# Patient Record
Sex: FEMALE | Race: BLACK OR AFRICAN AMERICAN | NOT HISPANIC OR LATINO | Employment: FULL TIME | URBAN - METROPOLITAN AREA
[De-identification: names, ages, dates, MRNs, and addresses within clinical notes are randomized per-mention and may not be internally consistent; named-entity substitution may affect disease eponyms.]

---

## 2017-12-28 LAB
INFLUENZA A ANTIGEN, POC: NEGATIVE
INFLUENZA B ANTIGEN, POC: NEGATIVE
RAPID GROUP A STREP (OHS): NEGATIVE

## 2018-05-16 ENCOUNTER — HISTORICAL (OUTPATIENT)
Dept: ADMINISTRATIVE | Facility: HOSPITAL | Age: 38
End: 2018-05-16

## 2018-05-16 LAB
ABS NEUT (OLG): 1.1
ALBUMIN SERPL-MCNC: 4.2 GM/DL (ref 3.4–5)
ALBUMIN/GLOB SERPL: 1.83 {RATIO} (ref 1.5–2.5)
ALP SERPL-CCNC: 19 UNIT/L (ref 38–126)
ALT SERPL-CCNC: 8 UNIT/L (ref 7–52)
AST SERPL-CCNC: 12 UNIT/L (ref 15–37)
BILIRUB SERPL-MCNC: 0.6 MG/DL (ref 0.2–1)
BILIRUBIN DIRECT+TOT PNL SERPL-MCNC: 0.1 MG/DL (ref 0–0.5)
BILIRUBIN DIRECT+TOT PNL SERPL-MCNC: 0.5 MG/DL
BUN SERPL-MCNC: 14 MG/DL (ref 7–18)
CALCIUM SERPL-MCNC: 8.6 MG/DL (ref 8.5–10)
CHLORIDE SERPL-SCNC: 105 MMOL/L (ref 98–107)
CHOLEST SERPL-MCNC: 158 MG/DL (ref 0–200)
CHOLEST/HDLC SERPL: 3 {RATIO}
CO2 SERPL-SCNC: 31 MMOL/L (ref 21–32)
CREAT SERPL-MCNC: 0.78 MG/DL (ref 0.6–1.3)
DEPRECATED CALCIDIOL+CALCIFEROL SERPL-MC: 19.9 NG/ML (ref 30–80)
ERYTHROCYTE [DISTWIDTH] IN BLOOD BY AUTOMATED COUNT: 12.1 % (ref 11.5–17)
GLOBULIN SER-MCNC: 2.3 GM/DL (ref 1.2–3)
GLUCOSE SERPL-MCNC: 89 MG/DL (ref 74–106)
HCT VFR BLD AUTO: 38.1 % (ref 37–47)
HDLC SERPL-MCNC: 52 MG/DL (ref 35–60)
HGB BLD-MCNC: 12.8 GM/DL (ref 12–16)
LDLC SERPL CALC-MCNC: 89 MG/DL (ref 0–129)
LYMPHOCYTES # BLD AUTO: 1.3 X10(3)/MCL (ref 0.6–3.4)
LYMPHOCYTES NFR BLD AUTO: 46.6 % (ref 13–40)
MCH RBC QN AUTO: 32.6 PG (ref 27–31.2)
MCHC RBC AUTO-ENTMCNC: 34 GM/DL (ref 32–36)
MCV RBC AUTO: 97 FL (ref 80–94)
MONOCYTES # BLD AUTO: 0.3 X10(3)/MCL (ref 0–1.8)
MONOCYTES NFR BLD AUTO: 12.5 % (ref 0.1–24)
NEUTROPHILS NFR BLD AUTO: 40.9 % (ref 47–80)
PLATELET # BLD AUTO: 224 X10(3)/MCL (ref 130–400)
PMV BLD AUTO: 10.9 FL
POTASSIUM SERPL-SCNC: 4.9 MMOL/L (ref 3.5–5.1)
PROT SERPL-MCNC: 6.5 GM/DL (ref 6.4–8.2)
RBC # BLD AUTO: 3.93 X10(6)/MCL (ref 4.2–5.4)
SODIUM SERPL-SCNC: 140 MMOL/L (ref 136–145)
TRIGL SERPL-MCNC: 35 MG/DL (ref 30–150)
VLDLC SERPL CALC-MCNC: 7 MG/DL
WBC # SPEC AUTO: 2.7 X10(3)/MCL (ref 4.5–11.5)

## 2018-12-31 ENCOUNTER — HISTORICAL (OUTPATIENT)
Dept: ADMINISTRATIVE | Facility: HOSPITAL | Age: 38
End: 2018-12-31

## 2018-12-31 LAB
ABS NEUT (OLG): 1.7 X10(3)/MCL (ref 2.1–9.2)
ALBUMIN SERPL-MCNC: 4.3 GM/DL (ref 3.4–5)
ALBUMIN/GLOB SERPL: 1.54 {RATIO} (ref 1.5–2.5)
ALP SERPL-CCNC: 21 UNIT/L (ref 38–126)
ALT SERPL-CCNC: 15 UNIT/L (ref 7–52)
AST SERPL-CCNC: 12 UNIT/L (ref 15–37)
BILIRUB SERPL-MCNC: 0.8 MG/DL (ref 0.2–1)
BILIRUBIN DIRECT+TOT PNL SERPL-MCNC: 0.2 MG/DL (ref 0–0.5)
BILIRUBIN DIRECT+TOT PNL SERPL-MCNC: 0.6 MG/DL
BUN SERPL-MCNC: 15 MG/DL (ref 7–18)
CALCIUM SERPL-MCNC: 9.2 MG/DL (ref 8.5–10)
CHLORIDE SERPL-SCNC: 103 MMOL/L (ref 98–107)
CO2 SERPL-SCNC: 32 MMOL/L (ref 21–32)
CREAT SERPL-MCNC: 0.83 MG/DL (ref 0.6–1.3)
DEPRECATED CALCIDIOL+CALCIFEROL SERPL-MC: 31.4 NG/ML (ref 30–80)
ERYTHROCYTE [DISTWIDTH] IN BLOOD BY AUTOMATED COUNT: 12.5 % (ref 11.5–17)
GLOBULIN SER-MCNC: 2.8 GM/DL (ref 1.2–3)
GLUCOSE SERPL-MCNC: 93 MG/DL (ref 74–106)
HCT VFR BLD AUTO: 40.7 % (ref 37–47)
HGB BLD-MCNC: 12.9 GM/DL (ref 12–16)
LYMPHOCYTES # BLD AUTO: 1.5 X10(3)/MCL (ref 0.6–3.4)
LYMPHOCYTES NFR BLD AUTO: 39.7 % (ref 13–40)
MCH RBC QN AUTO: 32.1 PG (ref 27–31.2)
MCHC RBC AUTO-ENTMCNC: 32 GM/DL (ref 32–36)
MCV RBC AUTO: 101 FL (ref 80–94)
MONOCYTES # BLD AUTO: 0.6 X10(3)/MCL (ref 0.1–1.3)
MONOCYTES NFR BLD AUTO: 15.9 % (ref 0.1–24)
NEUTROPHILS NFR BLD AUTO: 44.4 % (ref 47–80)
PLATELET # BLD AUTO: 303 X10(3)/MCL (ref 130–400)
PMV BLD AUTO: 9.6 FL (ref 9.4–12.4)
POTASSIUM SERPL-SCNC: 4.8 MMOL/L (ref 3.5–5.1)
PROT SERPL-MCNC: 7.1 GM/DL (ref 6.4–8.2)
RBC # BLD AUTO: 4.02 X10(6)/MCL (ref 4.2–5.4)
SODIUM SERPL-SCNC: 137 MMOL/L (ref 136–145)
WBC # SPEC AUTO: 3.8 X10(3)/MCL (ref 4.5–11.5)

## 2019-07-12 ENCOUNTER — HISTORICAL (OUTPATIENT)
Dept: ADMINISTRATIVE | Facility: HOSPITAL | Age: 39
End: 2019-07-12

## 2019-07-12 LAB
ABS NEUT (OLG): 1.3 X10(3)/MCL (ref 2.1–9.2)
ALBUMIN SERPL-MCNC: 4 GM/DL (ref 3.4–5)
ALBUMIN/GLOB SERPL: 1.54 {RATIO} (ref 1.5–2.5)
ALP SERPL-CCNC: 21 UNIT/L (ref 38–126)
ALT SERPL-CCNC: 7 UNIT/L (ref 7–52)
APPEARANCE, UA: ABNORMAL
AST SERPL-CCNC: 11 UNIT/L (ref 15–37)
BACTERIA #/AREA URNS AUTO: ABNORMAL /HPF
BILIRUB SERPL-MCNC: 0.6 MG/DL (ref 0.2–1)
BILIRUB UR QL STRIP: NEGATIVE MG/DL
BILIRUBIN DIRECT+TOT PNL SERPL-MCNC: 0.2 MG/DL (ref 0–0.5)
BILIRUBIN DIRECT+TOT PNL SERPL-MCNC: 0.4 MG/DL
BUN SERPL-MCNC: 13 MG/DL (ref 7–18)
CALCIUM SERPL-MCNC: 8.6 MG/DL (ref 8.5–10)
CHLORIDE SERPL-SCNC: 106 MMOL/L (ref 98–107)
CHOLEST SERPL-MCNC: 164 MG/DL (ref 0–200)
CHOLEST/HDLC SERPL: 2.9 {RATIO}
CO2 SERPL-SCNC: 29 MMOL/L (ref 21–32)
COLOR UR: YELLOW
CREAT SERPL-MCNC: 0.78 MG/DL (ref 0.6–1.3)
DEPRECATED CALCIDIOL+CALCIFEROL SERPL-MC: 23.5 NG/ML (ref 30–80)
ERYTHROCYTE [DISTWIDTH] IN BLOOD BY AUTOMATED COUNT: 12.5 % (ref 11.5–17)
GLOBULIN SER-MCNC: 2.6 GM/DL (ref 1.2–3)
GLUCOSE (UA): NEGATIVE MG/DL
GLUCOSE SERPL-MCNC: 98 MG/DL (ref 74–106)
HCT VFR BLD AUTO: 37.3 % (ref 37–47)
HDLC SERPL-MCNC: 57 MG/DL (ref 35–60)
HGB BLD-MCNC: 12.6 GM/DL (ref 12–16)
HGB UR QL STRIP: ABNORMAL UNIT/L
KETONES UR QL STRIP: NEGATIVE MG/DL
LDLC SERPL CALC-MCNC: 85 MG/DL (ref 0–129)
LEUKOCYTE ESTERASE UR QL STRIP: ABNORMAL UNIT/L
LYMPHOCYTES # BLD AUTO: 1.4 X10(3)/MCL (ref 0.6–3.4)
LYMPHOCYTES NFR BLD AUTO: 44.6 % (ref 13–40)
MCH RBC QN AUTO: 32.1 PG (ref 27–31.2)
MCHC RBC AUTO-ENTMCNC: 34 GM/DL (ref 32–36)
MCV RBC AUTO: 95 FL (ref 80–94)
MONOCYTES # BLD AUTO: 0.4 X10(3)/MCL (ref 0.1–1.3)
MONOCYTES NFR BLD AUTO: 11.7 % (ref 0.1–24)
NEUTROPHILS NFR BLD AUTO: 43.7 % (ref 47–80)
NITRITE UR QL STRIP.AUTO: NEGATIVE
PH UR STRIP: 7 [PH]
PLATELET # BLD AUTO: 244 X10(3)/MCL (ref 130–400)
PMV BLD AUTO: 10.4 FL (ref 9.4–12.4)
POTASSIUM SERPL-SCNC: 4.5 MMOL/L (ref 3.5–5.1)
PROT SERPL-MCNC: 6.6 GM/DL (ref 6.4–8.2)
PROT UR QL STRIP: NEGATIVE MG/DL
RBC # BLD AUTO: 3.93 X10(6)/MCL (ref 4.2–5.4)
RBC #/AREA URNS HPF: ABNORMAL /HPF
SODIUM SERPL-SCNC: 137 MMOL/L (ref 136–145)
SP GR UR STRIP: 1.02
SQUAMOUS EPITHELIAL, UA: ABNORMAL /LPF
TRIGL SERPL-MCNC: 37 MG/DL (ref 30–150)
TSH SERPL-ACNC: 1.85 MIU/ML (ref 0.35–4.94)
UROBILINOGEN UR STRIP-ACNC: 0.2 MG/DL
VLDLC SERPL CALC-MCNC: 7.4 MG/DL
WBC # SPEC AUTO: 3.1 X10(3)/MCL (ref 4.5–11.5)
WBC #/AREA URNS AUTO: ABNORMAL /[HPF]

## 2019-07-23 ENCOUNTER — HISTORICAL (OUTPATIENT)
Dept: ADMINISTRATIVE | Facility: HOSPITAL | Age: 39
End: 2019-07-23

## 2019-07-23 LAB
APPEARANCE, UA: CLEAR
BACTERIA #/AREA URNS AUTO: NORMAL /HPF
BILIRUB UR QL STRIP: NEGATIVE MG/DL
COLOR UR: YELLOW
GLUCOSE (UA): NEGATIVE MG/DL
HGB UR QL STRIP: NEGATIVE UNIT/L
KETONES UR QL STRIP: NEGATIVE MG/DL
LEUKOCYTE ESTERASE UR QL STRIP: NEGATIVE UNIT/L
NITRITE UR QL STRIP.AUTO: NEGATIVE
PH UR STRIP: 7.5 [PH]
PROT UR QL STRIP: NEGATIVE MG/DL
RBC #/AREA URNS HPF: NORMAL /HPF
SP GR UR STRIP: 1.01
SQUAMOUS EPITHELIAL, UA: NORMAL /LPF
UROBILINOGEN UR STRIP-ACNC: 0.2 MG/DL
WBC #/AREA URNS AUTO: NORMAL /[HPF]

## 2020-12-22 ENCOUNTER — HISTORICAL (OUTPATIENT)
Dept: ADMINISTRATIVE | Facility: HOSPITAL | Age: 40
End: 2020-12-22

## 2020-12-22 LAB
ABS NEUT (OLG): 1.6 X10(3)/MCL (ref 2.1–9.2)
ALBUMIN SERPL-MCNC: 4.1 GM/DL (ref 3.4–5)
ALBUMIN/GLOB SERPL: 1.58 {RATIO} (ref 1.5–2.5)
ALP SERPL-CCNC: 22 UNIT/L (ref 38–126)
ALT SERPL-CCNC: 8 UNIT/L (ref 7–52)
APPEARANCE, UA: ABNORMAL
AST SERPL-CCNC: 14 UNIT/L (ref 15–37)
BACTERIA #/AREA URNS AUTO: ABNORMAL /HPF
BILIRUB SERPL-MCNC: 0.6 MG/DL (ref 0.2–1)
BILIRUB UR QL STRIP: NEGATIVE MG/DL
BILIRUBIN DIRECT+TOT PNL SERPL-MCNC: 0.1 MG/DL (ref 0–0.5)
BILIRUBIN DIRECT+TOT PNL SERPL-MCNC: 0.5 MG/DL
BUN SERPL-MCNC: 17 MG/DL (ref 7–18)
CALCIUM SERPL-MCNC: 9 MG/DL (ref 8.5–10)
CHLORIDE SERPL-SCNC: 106 MMOL/L (ref 98–107)
CHOLEST SERPL-MCNC: 171 MG/DL (ref 0–200)
CHOLEST/HDLC SERPL: 3 {RATIO}
CO2 SERPL-SCNC: 29 MMOL/L (ref 21–32)
COLOR UR: YELLOW
CREAT SERPL-MCNC: 0.83 MG/DL (ref 0.6–1.3)
DEPRECATED CALCIDIOL+CALCIFEROL SERPL-MC: 64.1 NG/ML (ref 30–80)
ERYTHROCYTE [DISTWIDTH] IN BLOOD BY AUTOMATED COUNT: 12.9 % (ref 11.5–17)
GLOBULIN SER-MCNC: 2.6 GM/DL (ref 1.2–3)
GLUCOSE (UA): NEGATIVE MG/DL
GLUCOSE SERPL-MCNC: 87 MG/DL (ref 74–106)
HCT VFR BLD AUTO: 37.6 % (ref 37–47)
HDLC SERPL-MCNC: 57 MG/DL (ref 35–60)
HGB BLD-MCNC: 12.3 GM/DL (ref 12–16)
HGB UR QL STRIP: NEGATIVE UNIT/L
KETONES UR QL STRIP: NEGATIVE MG/DL
LDLC SERPL CALC-MCNC: 112 MG/DL (ref 0–129)
LEUKOCYTE ESTERASE UR QL STRIP: ABNORMAL UNIT/L
LYMPHOCYTES # BLD AUTO: 1.7 X10(3)/MCL (ref 0.6–3.4)
LYMPHOCYTES NFR BLD AUTO: 46.6 % (ref 13–40)
MCH RBC QN AUTO: 31.1 PG (ref 27–31.2)
MCHC RBC AUTO-ENTMCNC: 33 GM/DL (ref 32–36)
MCV RBC AUTO: 95 FL (ref 80–94)
MONOCYTES # BLD AUTO: 0.4 X10(3)/MCL (ref 0.1–1.3)
MONOCYTES NFR BLD AUTO: 10.2 % (ref 0.1–24)
NEUTROPHILS NFR BLD AUTO: 43.2 % (ref 47–80)
NITRITE UR QL STRIP.AUTO: NEGATIVE
PH UR STRIP: 7 [PH]
PLATELET # BLD AUTO: 318 X10(3)/MCL (ref 130–400)
PMV BLD AUTO: 10 FL (ref 9.4–12.4)
POTASSIUM SERPL-SCNC: 4.9 MMOL/L (ref 3.5–5.1)
PROT SERPL-MCNC: 6.7 GM/DL (ref 6.4–8.2)
PROT UR QL STRIP: NEGATIVE MG/DL
RBC # BLD AUTO: 3.95 X10(6)/MCL (ref 4.2–5.4)
RBC #/AREA URNS HPF: ABNORMAL /HPF
SODIUM SERPL-SCNC: 141 MMOL/L (ref 136–145)
SP GR UR STRIP: 1.02
SQUAMOUS EPITHELIAL, UA: ABNORMAL /LPF
TRIGL SERPL-MCNC: 65 MG/DL (ref 30–150)
TSH SERPL-ACNC: 1.84 MIU/ML (ref 0.35–4.94)
UROBILINOGEN UR STRIP-ACNC: 1 MG/DL
VLDLC SERPL CALC-MCNC: 13 MG/DL
WBC # SPEC AUTO: 3.7 X10(3)/MCL (ref 4.5–11.5)
WBC #/AREA URNS AUTO: ABNORMAL /[HPF]

## 2020-12-28 ENCOUNTER — HISTORICAL (OUTPATIENT)
Dept: ADMINISTRATIVE | Facility: HOSPITAL | Age: 40
End: 2020-12-28

## 2021-07-16 ENCOUNTER — HISTORICAL (OUTPATIENT)
Dept: ADMINISTRATIVE | Facility: HOSPITAL | Age: 41
End: 2021-07-16

## 2021-07-16 LAB
ABS NEUT (OLG): 1.2 X10(3)/MCL (ref 2.1–9.2)
ALBUMIN SERPL-MCNC: 4.3 GM/DL (ref 3.4–5)
ALBUMIN/GLOB SERPL: 1.59 {RATIO} (ref 1.5–2.5)
ALP SERPL-CCNC: 25 UNIT/L (ref 38–126)
ALT SERPL-CCNC: 10 UNIT/L (ref 7–52)
APPEARANCE, UA: ABNORMAL
AST SERPL-CCNC: 16 UNIT/L (ref 15–37)
BACTERIA #/AREA URNS AUTO: ABNORMAL /HPF
BILIRUB SERPL-MCNC: 0.5 MG/DL (ref 0.2–1)
BILIRUB UR QL STRIP: NEGATIVE MG/DL
BILIRUBIN DIRECT+TOT PNL SERPL-MCNC: 0.1 MG/DL (ref 0–0.5)
BILIRUBIN DIRECT+TOT PNL SERPL-MCNC: 0.4 MG/DL
BUN SERPL-MCNC: 9 MG/DL (ref 7–18)
CALCIUM SERPL-MCNC: 9 MG/DL (ref 8.5–10)
CHLORIDE SERPL-SCNC: 103 MMOL/L (ref 98–107)
CHOLEST SERPL-MCNC: 159 MG/DL (ref 0–200)
CHOLEST/HDLC SERPL: 3.1 {RATIO}
CO2 SERPL-SCNC: 29 MMOL/L (ref 21–32)
COLOR UR: YELLOW
CREAT SERPL-MCNC: 0.81 MG/DL (ref 0.6–1.3)
DEPRECATED CALCIDIOL+CALCIFEROL SERPL-MC: 40.7 NG/ML (ref 30–80)
ERYTHROCYTE [DISTWIDTH] IN BLOOD BY AUTOMATED COUNT: 12.1 % (ref 11.5–17)
GLOBULIN SER-MCNC: 2.7 GM/DL (ref 1.2–3)
GLUCOSE (UA): NEGATIVE MG/DL
GLUCOSE SERPL-MCNC: 86 MG/DL (ref 74–106)
HCT VFR BLD AUTO: 39 % (ref 37–47)
HDLC SERPL-MCNC: 51 MG/DL (ref 35–60)
HGB BLD-MCNC: 13 GM/DL (ref 12–16)
HGB UR QL STRIP: ABNORMAL UNIT/L
KETONES UR QL STRIP: ABNORMAL MG/DL
LDLC SERPL CALC-MCNC: 92 MG/DL (ref 0–129)
LEUKOCYTE ESTERASE UR QL STRIP: ABNORMAL UNIT/L
LYMPHOCYTES # BLD AUTO: 1.4 X10(3)/MCL (ref 0.6–3.4)
LYMPHOCYTES NFR BLD AUTO: 50 % (ref 13–40)
MCH RBC QN AUTO: 31.3 PG (ref 27–31.2)
MCHC RBC AUTO-ENTMCNC: 33 GM/DL (ref 32–36)
MCV RBC AUTO: 94 FL (ref 80–94)
MONOCYTES # BLD AUTO: 0.2 X10(3)/MCL (ref 0.1–1.3)
MONOCYTES NFR BLD AUTO: 6.7 % (ref 0.1–24)
NEUTROPHILS NFR BLD AUTO: 43.3 % (ref 47–80)
NITRITE UR QL STRIP.AUTO: NEGATIVE
PH UR STRIP: 7.5 [PH]
PLATELET # BLD AUTO: 289 X10(3)/MCL (ref 130–400)
PMV BLD AUTO: 10.5 FL (ref 9.4–12.4)
POTASSIUM SERPL-SCNC: 5 MMOL/L (ref 3.5–5.1)
PROT SERPL-MCNC: 7 GM/DL (ref 6.4–8.2)
PROT UR QL STRIP: NEGATIVE MG/DL
RBC # BLD AUTO: 4.16 X10(6)/MCL (ref 4.2–5.4)
RBC #/AREA URNS HPF: ABNORMAL /HPF
SODIUM SERPL-SCNC: 139 MMOL/L (ref 136–145)
SP GR UR STRIP: 1.02
SQUAMOUS EPITHELIAL, UA: ABNORMAL /LPF
TRIGL SERPL-MCNC: 55 MG/DL (ref 30–150)
UROBILINOGEN UR STRIP-ACNC: 0.2 MG/DL
VLDLC SERPL CALC-MCNC: 11 MG/DL
WBC # SPEC AUTO: 2.8 X10(3)/MCL (ref 4.5–11.5)
WBC #/AREA URNS AUTO: ABNORMAL /[HPF]

## 2022-04-10 ENCOUNTER — HISTORICAL (OUTPATIENT)
Dept: ADMINISTRATIVE | Facility: HOSPITAL | Age: 42
End: 2022-04-10

## 2022-04-26 VITALS
SYSTOLIC BLOOD PRESSURE: 132 MMHG | WEIGHT: 175.69 LBS | DIASTOLIC BLOOD PRESSURE: 80 MMHG | HEIGHT: 65 IN | BODY MASS INDEX: 29.27 KG/M2

## 2022-05-02 NOTE — HISTORICAL [facility]ERNER
This is a historical note converted from Cade. Formatting and pictures may have been removed.  Please reference Cade for original formatting and attached multimedia. Chief Complaint  6 MTH RECHECK  History of Present Illness  Patient here her 6 month f/u.  COVID infection on 11-8-20.? Finds that she gets SOB very easily since then and gets wheezing.? Had never been a problem for her prior to that.? Continues to have PND since infection.  Works as a  for last 2?years.  No hx of nicotine use.  Review of Systems  General:??????????????? Patient reports energy level is good.??Denies fever,chills, night sweats, fatigue or weakness.  HEENT:?????????????????? Denies vision changes or eye pain.? PND, sinus HA  Cardiovascular:??? Denies chest pain, palpitations, dyspnea on exertion, orthopnea.  Respiratory:???????? see HPI.  GI:????????????????????????? Denies nausea, emesis, constipation, diarrhea, melena, hematochezia or abdominal pain  :??????????????????????? No frequency, urgency, hematuria, discharge, or incontinence  MS:?????????????????????? Denies myalgias, arthralgias, joint effusion, edema, or weakness  Neuro:????????????????? No headaches, numbness in extremities, tingling, dizziness, or weakness  Psych:?????????????????? Denies anxiety, depression, suicidal or homicidal ideations, or irritability  Endo:??????????????????? Denies polyuria, polydipsia, polyphagia  Heme:????????????????? No abnormal bleeding or bruising. No lymph node enlargement or pain.  Physical Exam  Vitals & Measurements  HR:?65(Peripheral)? BP:?123/82?  HT:?165?cm? HT:?165.00?cm? WT:?79.9?kg? WT:?79.900?kg? BMI:?29.35?  General :?????Well-developed and? nourished, no apparent distress, alert and oriented ?4  HEENT:????? TMs and EACs within normal limits,?nasal mucosa with erythema and purulent d/c.? Mild erythema of pharnyx, moist mucosa.  Integument :????? Skin is intact with no erythema.? No pustules or vesicles.? No  rash or scale. No Lymphadenopathy.? No urticaria.? No abnormal nevi  Neck :?????Supple, no lymphadenopathy, no thyromegaly, no bruits, no jugular venous distention  Cardiovascular :?????? Regular rhythm and rate, no murmurs, radial and dorsal pedal pulses 2+ bilaterally  Respiratory :?????Occasional wheeze,?no rhonchi or crackles. ?Good air movement  Abdomen :?????? ?NABS, soft, nontender, no hepatosplenomegaly, no masses, no guarding or rebound????????????????????????  Ext:??????? No muscle tenderness, joints WNL, FROM, negative SLR, no?CCE  Neuro :???????? grossly intact.  Heme :?????? No bruising or petechiae?  Back:??????? no CVAT  ?   X-ray chest: No infiltrate or effusion  Assessment/Plan  1.?HTN - Hypertension?I10  ?Well-controlled  2.?Bronchospasm?J98.01  ?Given prescription for?albuterol?nebulizer solution, nebulizer and supplies.  3.?COVID-19?U07.1  ?Resolved.? Educated on what to expect?post infection.  4.?Sinusitis?J32.9  Prescribed Omnicef 300 mg twice daily for 10 days.? Administer 2 cc Celestone IM.  5.?UTI (urinary tract infection)?N39.0  ?Should be covered by Omnicef to use for sinusitis.  6.?Vitamin D deficiency?E55.9  ?Her numbers are in the desired range.? Recommend that she continue current level of supplementation.   Problem List/Past Medical History  Ongoing  HTN - Hypertension  Macrocytic  Vitamin D deficiency  Weight gain  Wellness examination  Historical  No qualifying data  Medications  albuterol 0.083% inhalation solution, 2.5 mg= 3 mL, INH, q4hr, PRN, 1 refills  losartan 100 mg oral tablet, See Instructions, 1 refills  Nebulizer Machine, See Instructions  Nebulizer Tubing Kit, See Instructions, 5 refills  Omnicef 300 mg oral capsule, 300 mg= 1 cap(s), Oral, q12hr  PROAIR HFA 90 MCG INHALER, Oral, q4hr,? ?Not Taking, Completed Rx  Allergies  No Known Medication Allergies  Social History  Abuse/Neglect  No, 12/28/2020  No, 07/17/2019  Alcohol  Never,  12/28/2017  Employment/School  Employed, 07/17/2019  Home/Environment  Lives with Children, Spouse., 07/17/2019  Tobacco  Never (less than 100 in lifetime), No, 12/28/2020  Never (less than 100 in lifetime), N/A, 07/17/2019  Never (less than 100 in lifetime), N/A, 01/02/2019  Family History  Hypertension.: Mother and Father.  Thyroid disorder: Mother.  Health Maintenance  Health Maintenance  ???Pending?(in the next year)  ??? ??OverDue  ??? ? ? ?Alcohol Misuse Screening due??01/02/20??and every 1??year(s)  ??? ? ? ?Influenza Vaccine due??10/01/20??and every 1??day(s)  ??? ??Due?  ??? ? ? ?ADL Screening due??12/31/20??and every 1??year(s)  ??? ? ? ?Depression Screening due??12/31/20??Unknown Frequency  ??? ? ? ?Hypertension Management-Education due??12/31/20??and every 1??year(s)  ??? ? ? ?Tetanus Vaccine due??12/31/20??and every 10??year(s)  ??? ??Due In Future?  ??? ? ? ?Obesity Screening not due until??01/01/21??and every 1??year(s)  ??? ? ? ?Hypertension Management-BMP not due until??12/22/21??and every 1??year(s)  ??? ? ? ?Blood Pressure Screening not due until??12/28/21??and every 1??year(s)  ??? ? ? ?Body Mass Index Check not due until??12/28/21??and every 1??year(s)  ??? ? ? ?Hypertension Management-Blood Pressure not due until??12/28/21??and every 1??year(s)  ???Satisfied?(in the past 1 year)  ??? ??Satisfied?  ??? ? ? ?Blood Pressure Screening on??12/28/20.??Satisfied by Kriss Watson CMA  ??? ? ? ?Body Mass Index Check on??12/28/20.??Satisfied by Kriss Watson CMA.  ??? ? ? ?Cervical Cancer Screening on??12/07/20.??Satisfied by Brenda Cunningham  ??? ? ? ?Diabetes Screening on??12/22/20.??Satisfied by Norma Heredia  ??? ? ? ?Hypertension Management-Blood Pressure on??12/28/20.??Satisfied by Kriss Watson CMA.  ??? ? ? ?Influenza Vaccine on??12/28/20.??Satisfied by Kriss Watson CMA.  ??? ? ? ?Lipid Screening on??12/22/20.??Satisfied by Norma Heredia  ??? ? ? ?Obesity  Screening on??12/28/20.??Satisfied by Kriss Watson CMA  ?  Lab Results  Test Name Test Result Date/Time Comments   Glucose Lvl 87.0 mg/dL 12/22/2020 08:49 CST    BUN 17.0 mg/dL 12/22/2020 08:49 CST    Creatinine 0.83 mg/dL 12/22/2020 08:49 CST    AST 14.0 unit/L (Low) 12/22/2020 08:49 CST    ALT 8.0 unit/L 12/22/2020 08:49 CST    Vit D 25 OH 64.1 ng/mL 12/22/2020 08:49 CST Vitamin D Hydroxy Reference Range:  ? 0-17 years- Deficiency: less than 20 ng/ml  Optimum level: > or = 20 ng/ml  ? 18 yrs or older: Deficiency: less than 20 ng/ml  Insufficiency: 20 - 29 ng/ml  Optimum level: 30 - 80 ng/ml  Possible toxicity: > or = 150 ng/ml   Chol 171.0 mg/dL 12/22/2020 08:49 CST    HDL 57.0 mg/dL 12/22/2020 08:49 CST    Trig 65.0 mg/dL 12/22/2020 08:49 CST    .0 mg/dL 12/22/2020 08:49 CST    Chol/HDL 3.0 12/22/2020 08:49 CST    TSH 1.845 mIU/mL 12/22/2020 08:49 CST    WBC 3.7 x10(3)/mcL (Low) 12/22/2020 08:49 CST    WBC 3.1 x10(3)/mcL (Low) 07/12/2019 07:33 CDT    Hgb 12.3 gm/dL 12/22/2020 08:49 CST    Hct 37.6 % 12/22/2020 08:49 CST    UA Nitri Negative 12/22/2020 08:49 CST    UA Leuk Est 3+ (Abnormal) 12/22/2020 08:49 CST    UA WBC cnt 10-25 (Abnormal) 12/22/2020 08:49 CST    UA Bact 1+ (Abnormal) 12/22/2020 08:49 CST    UA Squam Epithelial Moderate 12/22/2020 08:49 CST

## 2022-09-15 ENCOUNTER — HISTORICAL (OUTPATIENT)
Dept: ADMINISTRATIVE | Facility: HOSPITAL | Age: 42
End: 2022-09-15

## 2023-05-30 PROBLEM — F41.9 ANXIETY: Status: ACTIVE | Noted: 2023-05-30

## 2024-03-11 PROBLEM — D72.819 LEUKOPENIA: Status: ACTIVE | Noted: 2024-03-11

## 2024-03-11 PROBLEM — Z82.49 FAMILY HISTORY OF ISCHEMIC HEART DISEASE: Status: ACTIVE | Noted: 2024-03-11
